# Patient Record
Sex: FEMALE | Race: BLACK OR AFRICAN AMERICAN | NOT HISPANIC OR LATINO | Employment: OTHER | URBAN - METROPOLITAN AREA
[De-identification: names, ages, dates, MRNs, and addresses within clinical notes are randomized per-mention and may not be internally consistent; named-entity substitution may affect disease eponyms.]

---

## 2019-08-16 PROBLEM — B17.11: Status: RESOLVED | Noted: 2019-01-08 | Resolved: 2019-08-16

## 2019-08-16 PROBLEM — T23.251A: Status: RESOLVED | Noted: 2019-04-17 | Resolved: 2019-08-16

## 2019-08-16 PROBLEM — D17.20 LIPOMA OF EXTREMITY: Status: ACTIVE | Noted: 2019-01-08

## 2019-08-16 PROBLEM — M19.90 DJD (DEGENERATIVE JOINT DISEASE): Status: ACTIVE | Noted: 2019-08-16

## 2019-08-16 PROBLEM — T23.251A: Status: ACTIVE | Noted: 2019-04-17

## 2019-08-16 PROBLEM — G72.9 MYOPATHY: Status: ACTIVE | Noted: 2019-08-16

## 2019-08-16 PROBLEM — E46 MALNUTRITION: Status: ACTIVE | Noted: 2019-08-16

## 2019-08-16 PROBLEM — B17.11: Status: ACTIVE | Noted: 2019-01-08

## 2019-08-16 PROBLEM — E78.5 HYPERLIPEMIA: Status: ACTIVE | Noted: 2019-08-16
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
## 2020-03-16 PROBLEM — G93.9 BRAIN LESION: Status: ACTIVE | Noted: 2020-03-16

## 2020-03-16 PROBLEM — E87.6 HYPOKALEMIA: Status: ACTIVE | Noted: 2020-03-16

## 2020-03-16 PROBLEM — F12.10 MARIJUANA ABUSE: Status: ACTIVE | Noted: 2020-03-16

## 2020-03-16 PROBLEM — R07.89 OTHER CHEST PAIN: Status: ACTIVE | Noted: 2020-03-16

## 2020-03-16 PROBLEM — M85.80 OSTEOPENIA: Status: ACTIVE | Noted: 2020-03-16

## 2020-03-16 PROBLEM — F10.10 ETOH ABUSE: Status: ACTIVE | Noted: 2020-03-16

## 2020-03-17 PROBLEM — R07.9 CHEST PAIN: Status: ACTIVE | Noted: 2020-03-17

## 2020-07-01 PROBLEM — T24.212A PARTIAL THICKNESS BURN OF LEFT THIGH: Status: ACTIVE | Noted: 2020-07-01

## 2021-03-29 PROBLEM — N12 PYELONEPHRITIS: Status: ACTIVE | Noted: 2021-03-29

## 2021-03-29 PROBLEM — A41.9 SEPSIS: Status: ACTIVE | Noted: 2021-03-29

## 2021-03-30 PROBLEM — Z91.89: Status: ACTIVE | Noted: 2021-03-30

## 2021-03-30 PROBLEM — K76.9: Status: ACTIVE | Noted: 2021-03-30

## 2021-04-22 PROBLEM — R29.898 WEAKNESS OF BOTH LOWER EXTREMITIES: Status: ACTIVE | Noted: 2021-04-22

## 2022-04-01 PROBLEM — N17.9 AKI (ACUTE KIDNEY INJURY): Status: ACTIVE | Noted: 2022-04-01

## 2022-04-02 PROBLEM — R63.0 DECREASED APPETITE: Status: ACTIVE | Noted: 2022-04-02

## 2022-04-03 PROBLEM — E83.42 HYPOMAGNESEMIA: Status: ACTIVE | Noted: 2022-04-03

## 2022-04-03 PROBLEM — E44.1 MILD MALNUTRITION: Status: ACTIVE | Noted: 2019-08-16

## 2022-04-28 ENCOUNTER — EXTERNAL HOME HEALTH (OUTPATIENT)
Dept: HOME HEALTH SERVICES | Facility: HOSPITAL | Age: 62
End: 2022-04-28

## 2023-10-31 PROBLEM — Z87.891 PERSONAL HISTORY OF TOBACCO USE, PRESENTING HAZARDS TO HEALTH: Chronic | Status: ACTIVE | Noted: 2023-10-31

## 2024-08-22 ENCOUNTER — PATIENT OUTREACH (OUTPATIENT)
Dept: ADMINISTRATIVE | Facility: HOSPITAL | Age: 64
End: 2024-08-22
Payer: MEDICARE

## 2024-12-17 PROBLEM — M54.50 CHRONIC MIDLINE LOW BACK PAIN WITHOUT SCIATICA: Status: ACTIVE | Noted: 2024-12-17

## 2024-12-17 PROBLEM — G89.29 CHRONIC MIDLINE LOW BACK PAIN WITHOUT SCIATICA: Status: ACTIVE | Noted: 2024-12-17

## 2025-07-15 ENCOUNTER — OUTPATIENT CASE MANAGEMENT (OUTPATIENT)
Dept: ADMINISTRATIVE | Facility: OTHER | Age: 65
End: 2025-07-15
Payer: MEDICARE

## 2025-07-15 NOTE — PROGRESS NOTES
Outpatient Care Management   - Patient Assessment    Patient: Nieves Arango  MRN:  [id number]  Date of Service:  7/15/2025  Completed by:  Idania Cabrera LMSW  Referral Date:     Reason for Visit   Patient presents with    OPCM Enrollment Call    OPCM SW Follow Up Call       Brief Summary:  received a referral from outpatient provider for the following Low/Mod SW psychosocial needs DME- cane and Rolator.  The patient also requests assistance with transportation. Care plan was created in collaboration with patient input.   completed the SDOH questionnaire.     Pt [phone] contacted SW.  Pt provided 2 identifiers  and address. Pt resides at her apartment alone. Pt has three adult boys that have their own house and jobs, pt states that one of her sons will take her to the grocery store to go shopping and assist in anything that she needs. Pt states that she sometimes does not like to ask her son for help. Pt states she is independently with her ADLS, pt is able to bathing, dressing, bathroom,meal preparation,  housekeeping, laundry, shopping and medication. Pt states that she has difficulties walking and is scare of falling. Pt states that she would feel secured with a straight cane and Rolator to assist her to ambulate. SW informed pt that medical insurance does not cover  a straight cane pt might have to pay out of pocket and pick it up at her local [facility]. Pt stated that she asked the pharmacy and they needed a script from doctor. Pt states she has a standard walker and a blood pressure machine. Pt states she received social security income and does not have any difficulties paying for her rent, utilities and food.      Sw will reach out to doctor Axel Moody MD via epic chat about script cane for [facility] @ [address] [place] [address] and [employer] DME form for pt Rolator.  Pt states that she does have issues with transportation  due to medical insurance staying that they will pick her up and they do not  her. Pt states that she has contacted them and hopefully she will not have any issues any more. Sw informed pt about [employer] and pt asked to mail pt an application. Pt also asked for SW to send the advance directives via mail so she can go over this with her sons.     Pt denied having any thoughts of harming herself, or others, Pt scored a low 0  PHQ-2 assessment. Pt stated that she feels safe at home. Pt states that she does not feel isolated and lonely, pt states that she likes to be alone and like to hang out with her sons. Pt states that she likes to keep herself busy with cross word puzzles and different puzzle books. Pt states that she has been having difficulties with sleeping sometimes she sleeps through the day and is unable to sleep at nights. Pt states that she will bring this sleeping issues with her doctor in her next appointment. Pt also provided her pharmacy information phone # [phone] fax [phone].  SW will follow up if pt received Advance directives, [employer] application and an update on her DME. Sw provided SW contact info [phone]. SW will follow in one week. Pt voiced she understood.     Resources sent via mail below:  [id number]   [employer]-ADA-Application     Idania CARDONA, RAVI  [phone]

## 2025-07-18 ENCOUNTER — OUTPATIENT CASE MANAGEMENT (OUTPATIENT)
Dept: ADMINISTRATIVE | Facility: OTHER | Age: 65
End: 2025-07-18
Payer: MEDICARE

## 2025-07-23 ENCOUNTER — OUTPATIENT CASE MANAGEMENT (OUTPATIENT)
Dept: ADMINISTRATIVE | Facility: OTHER | Age: 65
End: 2025-07-23
Payer: MEDICARE

## 2025-07-29 ENCOUNTER — OUTPATIENT CASE MANAGEMENT (OUTPATIENT)
Dept: ADMINISTRATIVE | Facility: OTHER | Age: 65
End: 2025-07-29
Payer: MEDICARE

## 2025-08-06 ENCOUNTER — OUTPATIENT CASE MANAGEMENT (OUTPATIENT)
Dept: ADMINISTRATIVE | Facility: OTHER | Age: 65
End: 2025-08-06
Payer: MEDICARE

## 2025-08-11 ENCOUNTER — OUTPATIENT CASE MANAGEMENT (OUTPATIENT)
Dept: ADMINISTRATIVE | Facility: OTHER | Age: 65
End: 2025-08-11
Payer: MEDICARE

## 2025-08-18 ENCOUNTER — OUTPATIENT CASE MANAGEMENT (OUTPATIENT)
Dept: ADMINISTRATIVE | Facility: OTHER | Age: 65
End: 2025-08-18
Payer: MEDICARE